# Patient Record
Sex: FEMALE | Race: WHITE | NOT HISPANIC OR LATINO | Employment: FULL TIME | URBAN - METROPOLITAN AREA
[De-identification: names, ages, dates, MRNs, and addresses within clinical notes are randomized per-mention and may not be internally consistent; named-entity substitution may affect disease eponyms.]

---

## 2018-03-20 ENCOUNTER — OFFICE VISIT (OUTPATIENT)
Dept: OBSTETRICS AND GYNECOLOGY | Facility: CLINIC | Age: 28
End: 2018-03-20
Payer: OTHER GOVERNMENT

## 2018-03-20 VITALS
HEIGHT: 60 IN | WEIGHT: 137.13 LBS | BODY MASS INDEX: 26.92 KG/M2 | DIASTOLIC BLOOD PRESSURE: 62 MMHG | SYSTOLIC BLOOD PRESSURE: 108 MMHG

## 2018-03-20 DIAGNOSIS — Z30.015 ENCOUNTER FOR INITIAL PRESCRIPTION OF VAGINAL RING HORMONAL CONTRACEPTIVE: ICD-10-CM

## 2018-03-20 DIAGNOSIS — Z30.46 NEXPLANON REMOVAL: Primary | ICD-10-CM

## 2018-03-20 PROCEDURE — 99203 OFFICE O/P NEW LOW 30 MIN: CPT | Mod: 25,S$PBB,, | Performed by: OBSTETRICS & GYNECOLOGY

## 2018-03-20 PROCEDURE — 11982 REMOVE DRUG IMPLANT DEVICE: CPT | Mod: S$PBB,,, | Performed by: OBSTETRICS & GYNECOLOGY

## 2018-03-20 PROCEDURE — 99202 OFFICE O/P NEW SF 15 MIN: CPT | Mod: PBBFAC | Performed by: OBSTETRICS & GYNECOLOGY

## 2018-03-20 PROCEDURE — 99999 PR PBB SHADOW E&M-NEW PATIENT-LVL II: CPT | Mod: PBBFAC,,, | Performed by: OBSTETRICS & GYNECOLOGY

## 2018-03-20 PROCEDURE — 11982 REMOVE DRUG IMPLANT DEVICE: CPT | Mod: PBBFAC | Performed by: OBSTETRICS & GYNECOLOGY

## 2018-03-20 RX ORDER — ETONOGESTREL AND ETHINYL ESTRADIOL VAGINAL RING .015; .12 MG/D; MG/D
1 RING VAGINAL
Qty: 1 EACH | Refills: 12 | Status: SHIPPED | OUTPATIENT
Start: 2018-03-20 | End: 2019-03-20

## 2018-03-20 RX ORDER — BISOPROLOL FUMARATE 5 MG/1
5 TABLET, FILM COATED ORAL
COMMUNITY
Start: 2017-09-14

## 2018-03-20 NOTE — PROGRESS NOTES
Subjective:       Patient ID: Monique Kenyon is a 27 y.o. female.    Chief Complaint:  Procedure (nexplanon removal )      History of Present Illness  HPI  New patient to discuss removal of Nexplanon for irregular bleeding over 18 months  Discussed options to treat with estrogen.  Prefers removal   Will start on Nuvaring until partner has vasectomy   Reviewed risks and benefits of Nuvaring     GYN & OB History  Patient's last menstrual period was 2018.   Date of Last Pap: No result found    OB History    Para Term  AB Living   3 1   1   1   SAB TAB Ectopic Multiple Live Births                  # Outcome Date GA Lbr Isac/2nd Weight Sex Delivery Anes PTL Lv   3             2             1       Vag-Spont             Review of Systems  Review of Systems        Objective:    Physical Exam       Nexplanon Removal     Procedure:  Insertion of Nexplanon Device  Consent:  Verbal with patient to include complication of bleeding and infection   Time out:  Done with assistant, patient and MD  Prep:; Betadine  Placement:  Right inner arm  Anesthesia:  !% Lidocaine with Epinephrine  5 cc   Narrative:  The site on the inner arm was identified and prepped, local anesthesia injected, device removed with minimal trauma after incision made over the tip of the device   EBL  Less than 1 cc  Dressing :  Steri Strip with pressure dressing  Condition :  Good  Complications:  None  Follow up:  Prn pain, bleeding, redness , fever       Assessment:        1. Nexplanon removal    2. Encounter for initial prescription of vaginal ring hormonal contraceptive                Plan:      Monique was seen today for procedure.    Diagnoses and all orders for this visit:    Nexplanon removal  -     Removal of Nexplanon Device; Future    Encounter for initial prescription of vaginal ring hormonal contraceptive  -     etonogestrel-ethinyl estradiol (NUVARING) 0.12-0.015 mg/24 hr vaginal ring; Place  1 each vaginally every 28 days.